# Patient Record
Sex: MALE | Race: WHITE | URBAN - METROPOLITAN AREA
[De-identification: names, ages, dates, MRNs, and addresses within clinical notes are randomized per-mention and may not be internally consistent; named-entity substitution may affect disease eponyms.]

---

## 2018-05-03 ENCOUNTER — OFFICE VISIT (OUTPATIENT)
Dept: URGENT CARE | Facility: URGENT CARE | Age: 23
End: 2018-05-03
Payer: COMMERCIAL

## 2018-05-03 VITALS
SYSTOLIC BLOOD PRESSURE: 130 MMHG | TEMPERATURE: 98 F | HEART RATE: 87 BPM | WEIGHT: 231.38 LBS | DIASTOLIC BLOOD PRESSURE: 76 MMHG | OXYGEN SATURATION: 94 %

## 2018-05-03 DIAGNOSIS — H69.92 DYSFUNCTION OF LEFT EUSTACHIAN TUBE: Primary | ICD-10-CM

## 2018-05-03 PROCEDURE — 99203 OFFICE O/P NEW LOW 30 MIN: CPT | Performed by: PHYSICIAN ASSISTANT

## 2018-05-03 RX ORDER — FLUTICASONE PROPIONATE 50 MCG
2 SPRAY, SUSPENSION (ML) NASAL DAILY
Qty: 16 G | Refills: 0 | Status: SHIPPED | OUTPATIENT
Start: 2018-05-03

## 2018-05-03 NOTE — PROGRESS NOTES
SUBJECTIVE:   Troy Shin is a 23 year old male presenting with a chief complaint of   1) left ear pain for 2 days  2) sinus congestion for the past 2 days.  Onset of symptoms was as above.  Denies any fevers.    Denies any ear drainage or trauma.      Course of illness is worsening.    Severity moderate  Current and Associated symptoms: as above  Treatment measures tried include mucinex., ear wax softener.  Predisposing factors include h/o tympanostomy tubes as a child.    No past medical history on file.   Pt has a h/o ear infections    There is no problem list on file for this patient.    Social History   Substance Use Topics     Smoking status: Current Every Day Smoker     Types: Other     Smokeless tobacco: Current User     Alcohol use Not on file       ROS:  CONSTITUTIONAL:NEGATIVE for fever, chills, change in weight  INTEGUMENTARY/SKIN: NEGATIVE for worrisome rashes, moles or lesions  EYES: NEGATIVE for vision changes or irritation  ENT/MOUTH: as per HPI  RESP:NEGATIVE for significant cough or SOB  CV: NEGATIVE for chest pain, palpitations or peripheral edema  GI: NEGATIVE for nausea, abdominal pain, heartburn, or change in bowel habits  MUSCULOSKELETAL: NEGATIVE for significant arthralgias or myalgia  NEURO: NEGATIVE for weakness, dizziness or paresthesias  Review of systems negative except as stated above.    OBJECTIVE  :/76  Pulse 87  Temp 98  F (36.7  C) (Oral)  Wt 231 lb 6 oz (105 kg)  SpO2 94%  GENERAL APPEARANCE: healthy, alert and no distress  EYES: EOMI,  PERRL, conjunctiva clear  HENT: ear canals and TM's normal.  Nose and mouth without ulcers, erythema or lesions  HENT: nasal turbinates boggy with bluish hue and rhinorrhea white  NECK: supple, nontender, no lymphadenopathy  RESP: lungs clear to auscultation - no rales, rhonchi or wheezes  CV: regular rates and rhythm, normal S1 S2, no murmur noted  ABDOMEN:  soft, nontender, no HSM or masses and bowel sounds normal  NEURO: Normal  strength and tone, sensory exam grossly normal,  normal speech and mentation  SKIN: no suspicious lesions or rashes    H69.82) Dysfunction of left eustachian tube  (primary encounter diagnosis)  Comment:   Plan: fluticasone (FLONASE) 50 MCG/ACT spray 2 sprays each nostril at bedtime for minimum of 2 weeks, advise to do it twice a day for the first 4-5 days.  Use a saline nasal spray in the morning to moisturize            There may be underlying allergies, try cetirizine daily for a couple of weeks.      Tylenol or ibuprofen as needed.      Patient expresses understanding and agreement with the assessment and plan as above.

## 2018-05-03 NOTE — MR AVS SNAPSHOT
"              After Visit Summary   5/3/2018    Troy Shin    MRN: [id number]           Patient Information     Date Of Birth          1995        Visit Information        Provider Department      5/3/2018 5:45 PM Vicky Thomas PA-C [facility]        Today's Diagnoses     Dysfunction of left eustachian tube    -  1      Care Instructions    (H69.82) Dysfunction of left eustachian tube  (primary encounter diagnosis)  Comment:   Plan: fluticasone (FLONASE) 50 MCG/ACT spray 2 sprays each nostril at bedtime for minimum of 2 weeks, advise to do it twice a day for the first 4-5 days.  Use a saline nasal spray in the morning to moisturize            There may be underlying allergies, try cetirizine daily for a couple of weeks.      Tylenol or ibuprofen as needed.                Follow-ups after your visit        Who to contact     If you have questions or need follow up information about today's clinic visit or your schedule please contact [facility] directly at [phone].  Normal or non-critical lab and imaging results will be communicated to you by [employer], letter or phone within 4 business days after the clinic has received the results. If you do not hear from us within 7 days, please contact the clinic through [employer] or phone. If you have a critical or abnormal lab result, we will notify you by phone as soon as possible.  Submit refill requests through [employer] or call your pharmacy and they will forward the refill request to us. Please allow 3 business days for your refill to be completed.          Additional Information About Your Visit        MyChart Information     [employer] lets you send messages to your doctor, view your test results, renew your prescriptions, schedule appointments and more. To sign up, go to [email or web address] . Click on \"Log in\" on the left side of the screen, which will take you to the Welcome page. Then " "click on \"Sign up Now\" on the right side of the page.     You will be asked to enter the access code listed below, as well as some personal information. Please follow the directions to create your username and password.     Your access code is: [email or web address]  Expires: 2018  6:44 PM     Your access code will  in 90 days. If you need help or a new code, please call your [place] clinic or [phone].        Care EveryWhere ID     This is your Care EveryWhere ID. This could be used by other organizations to access your [place] medical records  [id number]        Your Vitals Were     Pulse Temperature Pulse Oximetry             87 98  F (36.7  C) (Oral) 94%          Blood Pressure from Last 3 Encounters:   18 130/76    Weight from Last 3 Encounters:   18 231 lb 6 oz (105 kg)              Today, you had the following     No orders found for display         Today's Medication Changes          These changes are accurate as of 5/3/18  6:44 PM.  If you have any questions, ask your nurse or doctor.               Start taking these medicines.        Dose/Directions    fluticasone 50 MCG/ACT spray   Commonly known as:  FLONASE   Used for:  Dysfunction of left eustachian tube   Started by:  Vicky Thomas PA-C        Dose:  2 spray   Spray 2 sprays into both nostrils daily   Quantity:  16 g   Refills:  0            Where to get your medicines      These medications were sent to [facility] [address] [address]     Phone:  [phone]     fluticasone 50 MCG/ACT spray                Primary Care Provider Fax #    Physician No Ref-Primary [phone]       No address on file        Equal Access to Services     AGNES PENN : Pablo Gallego, waaxda luqadaha, qaybta kaalmada adedavidyada, amy villatoro. So [facility] [place] [facility] [phone].    ATENCIÓN: Si habla español, tiene a beltran disposición servicios " ivan de asistencia lingüística. Fang acosta [phone].    We comply with applicable federal civil rights laws and [place] laws. We do not discriminate on the basis of race, color, national origin, age, disability, sex, sexual orientation, or gender identity.            Thank you!     Thank you for choosing [facility]  for your care. Our goal is always to provide you with excellent care. Hearing back from our patients is one way we can continue to improve our services. Please take a few minutes to complete the written survey that you may receive in the mail after your visit with us. Thank you!             Your Updated Medication List - Protect others around you: Learn how to safely use, store and throw away your medicines at www.disposemymeds.org.          This list is accurate as of 5/3/18  6:44 PM.  Always use your most recent med list.                   Brand Name Dispense Instructions for use Diagnosis    fluticasone 50 MCG/ACT spray    FLONASE    16 g    Spray 2 sprays into both nostrils daily    Dysfunction of left eustachian tube

## 2018-05-03 NOTE — PATIENT INSTRUCTIONS
(H69.82) Dysfunction of left eustachian tube  (primary encounter diagnosis)  Comment:   Plan: fluticasone (FLONASE) 50 MCG/ACT spray 2 sprays each nostril at bedtime for minimum of 2 weeks, advise to do it twice a day for the first 4-5 days.  Use a saline nasal spray in the morning to moisturize            There may be underlying allergies, try cetirizine daily for a couple of weeks.      Tylenol or ibuprofen as needed.